# Patient Record
Sex: FEMALE
[De-identification: names, ages, dates, MRNs, and addresses within clinical notes are randomized per-mention and may not be internally consistent; named-entity substitution may affect disease eponyms.]

---

## 2018-10-28 ENCOUNTER — HOSPITAL ENCOUNTER (EMERGENCY)
Dept: HOSPITAL 14 - H.ER | Age: 33
Discharge: HOME | End: 2018-10-28
Payer: COMMERCIAL

## 2018-10-28 VITALS
SYSTOLIC BLOOD PRESSURE: 101 MMHG | OXYGEN SATURATION: 98 % | TEMPERATURE: 98 F | RESPIRATION RATE: 18 BRPM | DIASTOLIC BLOOD PRESSURE: 64 MMHG | HEART RATE: 75 BPM

## 2018-10-28 VITALS — BODY MASS INDEX: 22.1 KG/M2

## 2018-10-28 DIAGNOSIS — O20.0: Primary | ICD-10-CM

## 2018-10-28 DIAGNOSIS — Z3A.09: ICD-10-CM

## 2018-10-28 LAB
ALBUMIN SERPL-MCNC: 3.8 G/DL (ref 3.5–5)
ALBUMIN/GLOB SERPL: 1.2 {RATIO} (ref 1–2.1)
ALT SERPL-CCNC: 24 U/L (ref 9–52)
AST SERPL-CCNC: 20 U/L (ref 14–36)
BACTERIA #/AREA URNS HPF: (no result) /[HPF]
BASOPHILS # BLD AUTO: 0 K/UL (ref 0–0.2)
BASOPHILS NFR BLD: 0.4 % (ref 0–2)
BILIRUB UR-MCNC: NEGATIVE MG/DL
BUN SERPL-MCNC: 7 MG/DL (ref 7–17)
CALCIUM SERPL-MCNC: 9 MG/DL (ref 8.4–10.2)
COLOR UR: YELLOW
EOSINOPHIL # BLD AUTO: 0.1 K/UL (ref 0–0.7)
EOSINOPHIL NFR BLD: 2 % (ref 0–4)
ERYTHROCYTE [DISTWIDTH] IN BLOOD BY AUTOMATED COUNT: 13.1 % (ref 11.5–14.5)
GFR NON-AFRICAN AMERICAN: > 60
GLUCOSE UR STRIP-MCNC: (no result) MG/DL
HGB BLD-MCNC: 12.2 G/DL (ref 12–16)
LEUKOCYTE ESTERASE UR-ACNC: (no result) LEU/UL
LYMPHOCYTES # BLD AUTO: 1.4 K/UL (ref 1–4.3)
LYMPHOCYTES NFR BLD AUTO: 28.2 % (ref 20–40)
MCH RBC QN AUTO: 27.9 PG (ref 27–31)
MCHC RBC AUTO-ENTMCNC: 33.9 G/DL (ref 33–37)
MCV RBC AUTO: 82.4 FL (ref 81–99)
MONOCYTES # BLD: 0.3 K/UL (ref 0–0.8)
MONOCYTES NFR BLD: 6 % (ref 0–10)
NEUTROPHILS # BLD: 3.3 K/UL (ref 1.8–7)
NEUTROPHILS NFR BLD AUTO: 63.4 % (ref 50–75)
NRBC BLD AUTO-RTO: 0 % (ref 0–0)
PH UR STRIP: 7 [PH] (ref 5–8)
PLATELET # BLD: 234 K/UL (ref 130–400)
PMV BLD AUTO: 7.4 FL (ref 7.2–11.7)
PROT UR STRIP-MCNC: NEGATIVE MG/DL
RBC # BLD AUTO: 4.35 MIL/UL (ref 3.8–5.2)
RBC # UR STRIP: NEGATIVE /UL
SP GR UR STRIP: 1.01 (ref 1–1.03)
SQUAMOUS EPITHIAL: 1 /HPF (ref 0–5)
URINE CLARITY: (no result)
UROBILINOGEN UR-MCNC: (no result) MG/DL (ref 0.2–1)
WBC # BLD AUTO: 5.1 K/UL (ref 4.8–10.8)

## 2018-10-28 NOTE — ED PDOC
HPI: General Adult


Time Seen by Provider: 10/28/18 09:34


Chief Complaint (Provider): pregnant


History Per: Patient


Additional Complaint(s): 


33-year-old female currently 9 weeks pregnant presents with lower back pain and 

groin pain 2 weeks. Patient's denies vaginal bleeding or discharge. No fever or

chills. Patient does have history of low back pain. This is her first pregnancy.





OB:  Dr. Patel





Past Medical History


Reviewed: Historical Data, Nursing Documentation, Vital Signs


Vital Signs: 





                                Last Vital Signs











Temp  98 F   10/28/18 09:07


 


Pulse  75   10/28/18 09:07


 


Resp  18   10/28/18 09:07


 


BP  101/64   10/28/18 09:07


 


Pulse Ox  98   10/28/18 09:07














- Medical History


PMH: Back Problems





- Surgical History


Surgical History: No Surg Hx





- Family History


Family History: States: No Known Family Hx





- Living Arrangements


Living Arrangements: With Family





- Social History


Current smoker - smoking cessation education provided: No


Alcohol: None


Drugs: Denies





- Allergies


Allergies/Adverse Reactions: 


                                    Allergies











Allergy/AdvReac Type Severity Reaction Status Date / Time


 


No Known Allergies Allergy   Verified 10/28/18 09:45














Review of Systems


ROS Statement: Except As Marked, All Systems Reviewed And Found Negative


Constitutional: Negative for: Fever


Musculoskeletal: Positive for: Back Pain, Other (groin pain)





Physical Exam





- Reviewed


Nursing Documentation Reviewed: Yes


Vital Signs Reviewed: Yes





- Physical Exam


Appears: Positive for: Well, Non-toxic, No Acute Distress


Skin: Positive for: Normal Color.  Negative for: Rash


Eye Exam: Positive for: Normal appearance


Cardiovascular/Chest: Positive for: Regular Rate, Rhythm


Respiratory: Positive for: Normal Breath Sounds


Gastrointestinal/Abdominal: Positive for: Soft.  Negative for: Tenderness, 

Distended, Guarding, Rebound


Back: Positive for: Vertebral Tenderness (right lower lumbar region).  Negative 

for: L CVA Tenderness, R CVA Tenderness


Extremity: Positive for: Normal ROM


Neurologic/Psych: Positive for: Alert, Oriented





- Laboratory Results


Result Diagrams: 


                                 10/28/18 10:00





                                 10/28/18 10:00


Urine Pregnancy POC: Positive





- ECG


O2 Sat by Pulse Oximetry: 98


Pulse Ox Interpretation: Normal





- Other Rad


  ** OB TV US


X-Ray: Read By Radiologist


X-Ray Interpretation: see below





Medical Decision Making


Medical Decision Makin year old pregnant female with back pain





Plan:


Urine dip


Urine pregnancy test


CBC


CMP


Beta


OB TV US 


Patient was offered Tylenol for pain but she declined





US:  IMPRESSION: Single intrauterine gestation with average ultrasound age of 8 

weeks, 3 days which is almost 3 weeks discordant by the age calculated by LMP.  

Fetal heart rate not identified.  Findings worrisome for intrauterine demise.  

Close clinical follow-up with serial pelvic sonography and serum beta HCG levels

is recommended.





Findings were d/w Dr. Patel, patient's OB.  He states to have patient follow

up this coming Tuesday in his office. Copy of US report and labs provided to 

patient as per Dr. Patel.





Disposition





- Clinical Impression


Clinical Impression: 


 Fetal demise, Threatened miscarriage








- Patient ED Disposition


Is Patient to be Admitted: No


Counseled Patient/Family Regarding: Studies Performed, Diagnosis, Need For 

Followup





- Disposition


Referrals: 


Carlos Patel MD [Staff Provider] - 


Disposition: Routine/Home


Disposition Time: 11:57


Condition: STABLE


Additional Instructions: 


Tylenol or Motrin for pain as needed. Follow up with Dr. Patel in office.


Instructions:  Miscarriage, Threatened Miscarriage





Results





- Lab Results


Lab Results: 

















  10/28/18 10/28/18 10/28/18





  10:05 10:00 10:00


 


WBC   5.1 


 


RBC   4.35 


 


Hgb   12.2 


 


Hct   35.8 


 


MCV   82.4 


 


MCH   27.9 


 


MCHC   33.9 


 


RDW   13.1 


 


Plt Count   234 


 


MPV   7.4 


 


Neut % (Auto)   63.4 


 


Lymph % (Auto)   28.2 


 


Mono % (Auto)   6.0 


 


Eos % (Auto)   2.0 


 


Baso % (Auto)   0.4 


 


Neut # (Auto)   3.3 


 


Lymph # (Auto)   1.4 


 


Mono # (Auto)   0.3 


 


Eos # (Auto)   0.1 


 


Baso # (Auto)   0.0 


 


Sodium    138


 


Potassium    4.3


 


Chloride    108 H


 


Carbon Dioxide    21 L


 


Anion Gap    13


 


BUN    7


 


Creatinine    0.4 L


 


Est GFR ( Amer)    > 60


 


Est GFR (Non-Af Amer)    > 60


 


Random Glucose    90


 


Calcium    9.0


 


Total Bilirubin    0.6


 


AST    20


 


ALT    24


 


Alkaline Phosphatase    46


 


Total Protein    7.0


 


Albumin    3.8


 


Globulin    3.1


 


Albumin/Globulin Ratio    1.2


 


Beta HCG, Quant    91020.00


 


Urine Color  Yellow  


 


Urine Clarity  Slighty-cloudy  


 


Urine pH  7.0  


 


Ur Specific [place]  1.011  


 


Urine Protein  Negative  


 


Urine Glucose (UA)  Neg  


 


Urine Ketones  Negative  


 


Urine Blood  Negative  


 


Urine Nitrate  Negative  


 


Urine Bilirubin  Negative  


 


Urine Urobilinogen  0.2-1.0  


 


Ur Leukocyte Esterase  Neg  


 


Urine RBC (Auto)  3  


 


Urine Microscopic WBC  2  


 


Ur Squamous Epith Cells  1  


 


Urine Bacteria  Occ H

## 2018-10-28 NOTE — US
Date of service: 



10/28/2018



PROCEDURE:  OB Pelvic Ultrasound



HISTORY:

9 weeks pregnant, abd pain



LMP: 08/12/2018 compatible with estimated gestational age of 11 weeks,

 0 days 



COMPARISON:

None available.



FINDINGS:



UTERUS:

Gestational sac: Single intrauterine gestation.  Measures 3.2 cm 

compatible with estimated gestational age of 8 weeks, 2 days. 



Yolk sac: Measures 0.5 cm 



Fetal pole: Crown-rump length measures 2.0 cm compatible with 

estimated gestational age of 8 weeks, 4 days 



Fetal Heart rate: Not identified.



Fetal age (Ultrasound estimated): 8 weeks, 3 days



Josei-gestational hemorrhage: None.



Date of delivery (Ultrasound estimated) : 06/06/2019



CERVIX:

Long and closed. No cervical abnormality seen.



RIGHT OVARY:

Measures 2.6 x 2.7 x 1.2 cm. No mass lesion. Normal flow. 



LEFT OVARY:

Measures 2.7 x 3.3 x 1.8 cm.  1.6 x 1.6 x 1.3 cm corpus luteum.  

Normal flow. 



FREE FLUID:

None.



OTHER FINDINGS:

None. 



IMPRESSION:

Single intrauterine gestation with average ultrasound age of 8 weeks, 

3 days which is almost 3 weeks discordant by the age calculated by 

LMP.  Fetal heart rate not identified.  Findings worrisome for 

intrauterine demise.  Close clinical follow-up with serial pelvic 

sonography and serum beta HCG levels is recommended.

## 2018-10-31 ENCOUNTER — HOSPITAL ENCOUNTER (OUTPATIENT)
Dept: HOSPITAL 14 - H.OPSURG | Age: 33
Discharge: HOME | End: 2018-10-31
Attending: OBSTETRICS & GYNECOLOGY
Payer: COMMERCIAL

## 2018-10-31 VITALS — RESPIRATION RATE: 18 BRPM | OXYGEN SATURATION: 100 %

## 2018-10-31 VITALS — TEMPERATURE: 98.1 F | DIASTOLIC BLOOD PRESSURE: 52 MMHG | SYSTOLIC BLOOD PRESSURE: 104 MMHG | HEART RATE: 70 BPM

## 2018-10-31 VITALS — BODY MASS INDEX: 21.6 KG/M2

## 2018-10-31 DIAGNOSIS — O02.1: Primary | ICD-10-CM

## 2018-10-31 LAB
ERYTHROCYTE [DISTWIDTH] IN BLOOD BY AUTOMATED COUNT: 13.1 % (ref 11.5–14.5)
HGB BLD-MCNC: 12 G/DL (ref 12–16)
MCH RBC QN AUTO: 27.9 PG (ref 27–31)
MCHC RBC AUTO-ENTMCNC: 33.6 G/DL (ref 33–37)
MCV RBC AUTO: 83.2 FL (ref 81–99)
PLATELET # BLD: 228 K/UL (ref 130–400)
RBC # BLD AUTO: 4.3 MIL/UL (ref 3.8–5.2)
WBC # BLD AUTO: 5.5 K/UL (ref 4.8–10.8)

## 2018-10-31 PROCEDURE — 36415 COLL VENOUS BLD VENIPUNCTURE: CPT

## 2018-10-31 PROCEDURE — 88305 TISSUE EXAM BY PATHOLOGIST: CPT

## 2018-10-31 PROCEDURE — 85027 COMPLETE CBC AUTOMATED: CPT

## 2018-10-31 PROCEDURE — 86900 BLOOD TYPING SEROLOGIC ABO: CPT

## 2018-10-31 PROCEDURE — 86850 RBC ANTIBODY SCREEN: CPT

## 2018-10-31 PROCEDURE — 59820 CARE OF MISCARRIAGE: CPT

## 2018-10-31 NOTE — OP
PROCEDURE DATE:  10/31/2018



PREOPERATIVE DIAGNOSIS:  Missed .



POSTOPERATIVE DIAGNOSIS:  Missed .



PROCEDURE:  Suction, dilation and curettage.



SURGEON:  Carlos Patel MD



ANESTHESIA:  General.



ANESTHESIA ADMINISTERED BY:  Dr. Rosales.



ESTIMATED BLOOD LOSS:  50 mL.



INTRAVENOUS FLUID INTAKE:  The patient received approximately 1 L of D5 LR

intraoperatively.



URINE OUTPUT:  The patient was straight cathed prior to starting the

procedure.



OPERATIVE FINDINGS:  Normal external female genitalia.  Urethra normal. 

Cervix smooth.  Uterus anteverted.  No adnexal mass.  Vagina pink.  Scant

blood noted in the vagina.



DESCRIPTION OF PROCEDURE:  After informed consent was obtained, the patient

was taken to the operating room, where she was given general anesthesia. 

She was then prepped and draped in a normal sterile fashion.  A weighted

speculum was then inserted into the vagina.  The cervix was visualized,

grasped with single-tooth tenaculum.  Cervix was then gently dilated and a

#8 curved suction curette was then introduced into the uterine cavity.  The

suction device was then activated.  It was rotated in a clockwise fashion. 

Products of conception were evacuated.  A sharp curettage was then

performed until a gritty texture was noted in the uterine cavity.  The

suction device was then reintroduced into the uterus, rotated in a

clockwise fashion to remove any remaining debris.  All instruments were

then removed from the vagina.  The tenaculum site was inspected and noted

to be hemostatic.  The patient received Methergine IM, and products of

conception were sent for chromosomal analysis.  All sponges, lap, needle

and instrument counts were correct x2, and the patient was taken to the

recovery room in awake and stable condition.





__________________________________________

Carlos Patel MD





DD:  10/31/2018 8:58:15

DT:  10/31/2018 10:00:24

Job # [id number]